# Patient Record
Sex: FEMALE | Race: WHITE | Employment: FULL TIME | URBAN - METROPOLITAN AREA
[De-identification: names, ages, dates, MRNs, and addresses within clinical notes are randomized per-mention and may not be internally consistent; named-entity substitution may affect disease eponyms.]

---

## 2017-05-07 ENCOUNTER — HOSPITAL ENCOUNTER (OUTPATIENT)
Dept: CT IMAGING | Facility: HOSPITAL | Age: 42
Discharge: HOME | End: 2017-05-07
Attending: FAMILY MEDICINE

## 2017-05-07 DIAGNOSIS — Z13.9 VISIT FOR SCREENING: ICD-10-CM

## 2017-05-30 NOTE — PROGRESS NOTES
Quick Note:    Coronary calcium scan results showed a score of 0, meaning there is no plaque formation in the arteries surrounding the heart and so no mature plaque has formed around the heart.    ______

## 2017-10-18 PROCEDURE — 87624 HPV HI-RISK TYP POOLED RSLT: CPT | Performed by: OBSTETRICS & GYNECOLOGY

## 2017-10-18 PROCEDURE — 88175 CYTOPATH C/V AUTO FLUID REDO: CPT | Performed by: OBSTETRICS & GYNECOLOGY

## 2025-05-02 ENCOUNTER — HOSPITAL ENCOUNTER (EMERGENCY)
Facility: HOSPITAL | Age: 50
Discharge: HOME | End: 2025-05-02
Attending: EMERGENCY MEDICINE
Payer: COMMERCIAL

## 2025-05-02 VITALS
RESPIRATION RATE: 14 BRPM | DIASTOLIC BLOOD PRESSURE: 88 MMHG | WEIGHT: 152.13 LBS | SYSTOLIC BLOOD PRESSURE: 137 MMHG | BODY MASS INDEX: 25 KG/M2 | TEMPERATURE: 98 F | OXYGEN SATURATION: 100 % | HEART RATE: 107 BPM

## 2025-05-02 DIAGNOSIS — H53.19 PHOTOPSIA: Primary | ICD-10-CM

## 2025-05-02 PROCEDURE — 99282 EMERGENCY DEPT VISIT SF MDM: CPT

## 2025-05-02 PROCEDURE — 99284 EMERGENCY DEPT VISIT MOD MDM: CPT

## 2025-05-02 NOTE — ED PROVIDER NOTES
Patient Seen in: [facility] Emergency Department      History     Chief Complaint   Patient presents with    Eye Visual Problem     Stated Complaint: flashing lights to both eyes at 930am, lasted 20 minutes, now resolved. no numb*    Subjective:   HPI    50-year-old female comes to the hospital with flashing in her eyes bilaterally with a moving shape of a C that was floating through her visual fields that lasted approximately 20 minutes and is now resolved.  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  She has no migraine history.  She denies any significant headaches.  Her vision is back to her normal self at this time.  She has no eye pain.  She denies any other headaches, double vision, numbness, tingling or weakness.  She is no other complaints this time.  History of Present Illness               Objective:     Past Medical History:    ABDOMINAL PAIN    Dysplasia of cervix    lgsil    Infertility female    for 2 years              Past Surgical History:   Procedure Laterality Date    Colposcopy,bx cervix/endocerv curr  2005    Other surgical history      wisdom teeth    Other surgical history  6 yrs old    lost part of 2 fingers                Social History     Socioeconomic History    Marital status:    Tobacco Use    Smoking status: Never    Smokeless tobacco: Never   Vaping Use    Vaping status: Never Used   Substance and Sexual Activity    Alcohol use: Yes     Comment: rare    Drug use: No    Sexual activity: Yes     Partners: Male     Birth control/protection: Condom                                Physical Exam     ED Triage Vitals [05/02/25 1035]   /88   Pulse 107   Resp 14   Temp 97.9 °F (36.6 °C)   Temp src Temporal   SpO2 100 %   O2 Device None (Room air)       Current Vitals:   Vital Signs  BP: 137/88  Pulse: 107  Resp: 14  Temp: 97.9 °F (36.6 °C)  Temp src: Temporal    Oxygen Therapy  SpO2: 100 %  O2 Device: None (Room air)        Physical  Exam  HEENT; NCAT, EOMI, throat clear, neck supple, no LAD, no JVD PERRLA, conjunctiva normal, cornea is clear, fundi exam showing no obvious deficit at this time.  Visual acuities as noted.  Heart S1S2 RRR  lungs: CTAB  abd: Soft NT, ND,  NABS without rebound or guarding  Ext no C/C/E  Neuroexam without focal deficits  Physical Exam                ED Course   Labs Reviewed - No data to display    ED Course as of 05/02/25 1049  ------------------------------------------------------------  Time: 05/02 1047  Comment: While here the patient had visual acuities as noted.  I spoke with Dr. iPtts from ophthalmology and at this time the patient will be discharged from the emergency room, go with the  to the office now for a more thorough exam.     Results                                 MDM      Differential diagnosis included posterior vitreal detachment, retinal tear, retinal detachment, migraine but not limited such.  At this time the patient be discharged from the emergency room with prompt follow-up now to see Dr. Pitts in his office for more thorough exam.    This note was prepared using Dragon [employer] voice recognition dictation software.  As a result errors may occur.  When identified to these areas have been corrected.  While every attempt is made to correct errors during dictation discrepancies may still exist.  Please contact if there are any errors.        Medical Decision Making      Disposition and Plan     Clinical Impression:  1. Photopsia         Disposition:  Discharge  5/2/2025 10:49 am    Follow-up:  Manuel Pitts MD  [address] [facility] [address]  [phone]    Go today            Medications Prescribed:  There are no discharge medications for this patient.      Supplementary Documentation:

## 2025-05-02 NOTE — ED INITIAL ASSESSMENT (HPI)
Pt presents to ed with visual changes with \"sparkling flashes\" that started at 0900 which has completley subsided. NIH is 0 upon arrival